# Patient Record
Sex: FEMALE
[De-identification: names, ages, dates, MRNs, and addresses within clinical notes are randomized per-mention and may not be internally consistent; named-entity substitution may affect disease eponyms.]

---

## 2024-05-13 PROBLEM — Z00.00 ENCOUNTER FOR PREVENTIVE HEALTH EXAMINATION: Status: ACTIVE | Noted: 2024-05-13

## 2024-05-14 ENCOUNTER — APPOINTMENT (OUTPATIENT)
Dept: RHEUMATOLOGY | Facility: CLINIC | Age: 64
End: 2024-05-14
Payer: COMMERCIAL

## 2024-05-14 ENCOUNTER — TRANSCRIPTION ENCOUNTER (OUTPATIENT)
Age: 64
End: 2024-05-14

## 2024-05-14 VITALS
HEIGHT: 66 IN | TEMPERATURE: 97.7 F | DIASTOLIC BLOOD PRESSURE: 88 MMHG | BODY MASS INDEX: 36.48 KG/M2 | OXYGEN SATURATION: 96 % | SYSTOLIC BLOOD PRESSURE: 134 MMHG | HEART RATE: 99 BPM | WEIGHT: 227 LBS

## 2024-05-14 DIAGNOSIS — Z84.1 FAMILY HISTORY OF DISORDERS OF KIDNEY AND URETER: ICD-10-CM

## 2024-05-14 DIAGNOSIS — G35 MULTIPLE SCLEROSIS: ICD-10-CM

## 2024-05-14 DIAGNOSIS — R20.2 ANESTHESIA OF SKIN: ICD-10-CM

## 2024-05-14 DIAGNOSIS — Z82.61 FAMILY HISTORY OF ARTHRITIS: ICD-10-CM

## 2024-05-14 DIAGNOSIS — R68.89 OTHER GENERAL SYMPTOMS AND SIGNS: ICD-10-CM

## 2024-05-14 DIAGNOSIS — R76.8 OTHER SPECIFIED ABNORMAL IMMUNOLOGICAL FINDINGS IN SERUM: ICD-10-CM

## 2024-05-14 DIAGNOSIS — Z78.0 ASYMPTOMATIC MENOPAUSAL STATE: ICD-10-CM

## 2024-05-14 DIAGNOSIS — Z83.3 FAMILY HISTORY OF DIABETES MELLITUS: ICD-10-CM

## 2024-05-14 DIAGNOSIS — R20.0 ANESTHESIA OF SKIN: ICD-10-CM

## 2024-05-14 DIAGNOSIS — Z82.49 FAMILY HISTORY OF ISCHEMIC HEART DISEASE AND OTHER DISEASES OF THE CIRCULATORY SYSTEM: ICD-10-CM

## 2024-05-14 PROCEDURE — 99204 OFFICE O/P NEW MOD 45 MIN: CPT

## 2024-05-14 RX ORDER — GABAPENTIN 300 MG
300 TABLET ORAL
Refills: 0 | Status: ACTIVE | COMMUNITY

## 2024-05-14 NOTE — ASSESSMENT
[FreeTextEntry1] : 63 year old with hx of MS, knee OA  here for eval of SLE   Patient has hx of MS diagnosed 2002 (optic neuritis). her symptoms have been stable (last 7 years without MRI changes, was on meds from [id number] copaxone)  Starting mid summer 2023, she started having numbness and tingling in foot with burning pain at night. She was put on  gabapentin which helped her. She takes advil at night. She had work up done by neuro which showed CHRISTAL titer <1:80 dsDNA 10 had imaging done [facility] which was stable per patient  Did not do EMG or xray of  lumbar spine Chronic knee OA for which she sees ortho Chronic fatigue and headaches since MS diagnosis   Patient does not have obvious signs of underlying connective tissue diseases (CTDs) including inflammatory joint pain, malar rash, photosensitivity, severe sicca symptoms, Raynauds. Based on existing labs, patient does not have anemia, leukopenia, kidney disease. Exam without synovitis, rashes, changes of Raynauds. There is low suspicion for underlying CTD. CHRISTAL is a marker that is sensitive but not specific for underlying rheumatologic diseases such as lupus. The most common causes of positive CHRISTAL in patients without underlying rheumatologic diseases are infections, malignancies, and other autoimmune diseases such as Hashimotos. Up to 30% of patients without any underlying disease can have positive CHRISTAL. Her CHRISTAL titer is <1:80.  - Serologies as below. Will also obtain lumbar spine xr. -Suspicion is low for CTD causing her numbness/tingling. MS can cause numbness/ tingling.  -Continue to follow up with her neuro for numbness/tingling. and possibly consider EMG with them   Will call back with results when available  Total time spent in review of patient history, clinical exam, management, counseling, and plan of care: 50 min

## 2024-05-14 NOTE — HISTORY OF PRESENT ILLNESS
[FreeTextEntry1] : 63 year old with hx of MS, knee OA  here for eval of SLE   Patient has hx of MS diagnosed 2002 (optic neuritis). her symptoms have been stable (last 7 years without MRI changes, was on meds from [id number] copaxone)  Starting mid summer 2023, she started having numbness and tingling in foot with burning pain at night. She was put on  gabapentin which helped her. She takes advil at night. She had work up done by neuro which showed CHRISTAL titer <1:80 dsDNA 10 had imaging done [facility] which was stable per patient  Did not do EMG or xray of  lumbar spine Chronic knee OA for which she sees ortho Chronic fatigue and headaches since MS diagnosis   Patient denies joint erythema/warmth, morning stiffness, fatigue, fever, chills, weight loss, nasopharyngeal ulcers, chest pain, abdominal pain, , cough, SOB, nausea, vomiting, diarrhea, constipation, blood in stool, dysuria, hematuria, rash, photosensitivity, Raynaud's, alopecia, dry eyes, dry mouth,  headaches, eye pain/redness, vision changes, myalgias, muscle weakness, miscarriages, Hx of DVT/PEs.     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

## 2024-05-14 NOTE — PHYSICAL EXAM
[TextEntry] :   GENERAL: Appears in no acute distress HEENT: EOMI. No conjunctival erythema. Moist mucous membranes. No nasopharyngeal ulcers NECK: Supple, no cervical lymphadenopathy CARDIOVASCULAR: RRR. S1, S2 auscultated.  PULMONARY: Clear to auscultation b/l, no wheezes, rales, or crackles MSK: No active synovitis, swelling, erythema, or warmth. No joint tenderness to palpation. No Bouchards or Heberdens nodes. No deformities. No crepitus. Normal ROM of b/l upper and lower extremities. No dactylitis, enthesitis, nail pitting SKIN: No lesions or rashes No sclerodactyly, telangiectasias NEURO: No focal deficits. Motor strength 5/5 in major muscle groups of b/l UE and LE. Sensation to soft touch intact in major dermatomes of b/l UE and LE. PSYCH:  Normal affect and thought process.

## 2024-05-15 ENCOUNTER — TRANSCRIPTION ENCOUNTER (OUTPATIENT)
Age: 64
End: 2024-05-15

## 2024-05-15 LAB
APPEARANCE: CLEAR
BILIRUBIN URINE: NEGATIVE
BLOOD URINE: NEGATIVE
COLOR: YELLOW
CREAT SPEC-SCNC: 37 MG/DL
CREAT/PROT UR: 0.3 RATIO
DSDNA AB SER-ACNC: 5 IU/ML
GLUCOSE QUALITATIVE U: NEGATIVE MG/DL
HCT VFR BLD CALC: 41.8 %
HGB BLD-MCNC: 13.7 G/DL
KETONES URINE: NEGATIVE MG/DL
LEUKOCYTE ESTERASE URINE: NEGATIVE
MCHC RBC-ENTMCNC: 29.5 PG
MCHC RBC-ENTMCNC: 32.8 GM/DL
MCV RBC AUTO: 89.9 FL
NITRITE URINE: NEGATIVE
PH URINE: 6
PLATELET # BLD AUTO: 343 K/UL
PROT UR-MCNC: 10 MG/DL
PROTEIN URINE: NEGATIVE MG/DL
RBC # BLD: 4.65 M/UL
RBC # FLD: 13.7 %
SPECIFIC GRAVITY URINE: 1.01
UROBILINOGEN URINE: 0.2 MG/DL
WBC # FLD AUTO: 9.02 K/UL

## 2024-05-16 ENCOUNTER — NON-APPOINTMENT (OUTPATIENT)
Age: 64
End: 2024-05-16

## 2024-05-17 ENCOUNTER — TRANSCRIPTION ENCOUNTER (OUTPATIENT)
Age: 64
End: 2024-05-17

## 2024-05-17 LAB
ANA SER IF-ACNC: NEGATIVE
C3 SERPL-MCNC: 166 MG/DL
C4 SERPL-MCNC: 21 MG/DL
ENA RNP AB SER IA-ACNC: 5.1 AL
ENA SM AB SER IA-ACNC: <0.2 AL
THYROGLOB AB SERPL-ACNC: <20 IU/ML
THYROPEROXIDASE AB SERPL IA-ACNC: 47.9 IU/ML